# Patient Record
Sex: FEMALE | Race: WHITE | NOT HISPANIC OR LATINO | Employment: OTHER | URBAN - METROPOLITAN AREA
[De-identification: names, ages, dates, MRNs, and addresses within clinical notes are randomized per-mention and may not be internally consistent; named-entity substitution may affect disease eponyms.]

---

## 2023-03-14 ENCOUNTER — TELEPHONE (OUTPATIENT)
Dept: PRIMARY CARE | Facility: CLINIC | Age: 68
End: 2023-03-14

## 2023-03-14 ENCOUNTER — OFFICE VISIT (OUTPATIENT)
Dept: PRIMARY CARE | Facility: CLINIC | Age: 68
End: 2023-03-14
Payer: MEDICARE

## 2023-03-14 VITALS
DIASTOLIC BLOOD PRESSURE: 64 MMHG | HEART RATE: 83 BPM | WEIGHT: 149.4 LBS | BODY MASS INDEX: 25.08 KG/M2 | SYSTOLIC BLOOD PRESSURE: 148 MMHG | OXYGEN SATURATION: 98 % | TEMPERATURE: 96.7 F

## 2023-03-14 DIAGNOSIS — L30.9 DERMATITIS: Primary | ICD-10-CM

## 2023-03-14 PROBLEM — I10 HYPERTENSION: Status: ACTIVE | Noted: 2023-03-14

## 2023-03-14 PROBLEM — E78.5 BORDERLINE HYPERLIPIDEMIA: Status: ACTIVE | Noted: 2023-03-14

## 2023-03-14 PROBLEM — M85.80 OSTEOPENIA: Status: ACTIVE | Noted: 2023-03-14

## 2023-03-14 PROBLEM — N81.11 MIDLINE CYSTOCELE: Status: ACTIVE | Noted: 2023-03-14

## 2023-03-14 PROBLEM — L98.9 SKIN LESION: Status: ACTIVE | Noted: 2023-03-14

## 2023-03-14 PROBLEM — C50.919 BREAST CANCER (MULTI): Status: ACTIVE | Noted: 2023-03-14

## 2023-03-14 PROBLEM — N81.4 UTERINE PROLAPSE: Status: ACTIVE | Noted: 2023-03-14

## 2023-03-14 PROCEDURE — 1159F MED LIST DOCD IN RCRD: CPT | Performed by: INTERNAL MEDICINE

## 2023-03-14 PROCEDURE — 99213 OFFICE O/P EST LOW 20 MIN: CPT | Performed by: INTERNAL MEDICINE

## 2023-03-14 PROCEDURE — 3078F DIAST BP <80 MM HG: CPT | Performed by: INTERNAL MEDICINE

## 2023-03-14 PROCEDURE — 3077F SYST BP >= 140 MM HG: CPT | Performed by: INTERNAL MEDICINE

## 2023-03-14 RX ORDER — HYDROCORTISONE 25 MG/G
CREAM TOPICAL 2 TIMES DAILY PRN
Qty: 30 G | Refills: 0 | Status: SHIPPED | OUTPATIENT
Start: 2023-03-14 | End: 2023-11-14 | Stop reason: ALTCHOICE

## 2023-03-14 RX ORDER — FLUOROMETHOLONE 1 MG/ML
SUSPENSION/ DROPS OPHTHALMIC
COMMUNITY

## 2023-03-14 RX ORDER — BUTENAFINE HYDROCHLORIDE 10 MG/G
CREAM TOPICAL
Qty: 48 G | Refills: 1 | Status: SHIPPED | OUTPATIENT
Start: 2023-03-14 | End: 2023-03-15 | Stop reason: SDUPTHER

## 2023-03-14 RX ORDER — ACETAMINOPHEN 500 MG
TABLET ORAL
COMMUNITY

## 2023-03-14 ASSESSMENT — PATIENT HEALTH QUESTIONNAIRE - PHQ9
1. LITTLE INTEREST OR PLEASURE IN DOING THINGS: NOT AT ALL
2. FEELING DOWN, DEPRESSED OR HOPELESS: NOT AT ALL
SUM OF ALL RESPONSES TO PHQ9 QUESTIONS 1 AND 2: 0

## 2023-03-14 NOTE — PROGRESS NOTES
Subjective   Patient ID: Jessica Montiel is a 67 y.o. female who presents for Rash (Red itchy rash on neck).  Subjective   Jessica Montiel is a 67 y.o. female who presents for evaluation of a rash involving the upper body. Rash started 3 week ago. Lesions are red, and flat in texture. Rash has not changed over time. Rash is pruritic. Associated symptoms: none. Patient denies: fever. Patient has not had contacts with similar rash. Patient has not had new exposures (soaps, lotions, laundry detergents, foods, medications, plants, insects or animals).    Objective   /64 (BP Location: Left arm, Patient Position: Sitting, BP Cuff Size: Adult)   Pulse 83   Temp 35.9 °C (96.7 °F) (Temporal)   Wt 67.8 kg (149 lb 6.4 oz)   SpO2 98%   BMI 25.08 kg/m²   General:  alert   Skin:  erythema noted on neck           Review of Systems   Skin:  Positive for rash.     Objective   /64 (BP Location: Left arm, Patient Position: Sitting, BP Cuff Size: Adult)   Pulse 83   Temp 35.9 °C (96.7 °F) (Temporal)   Wt 67.8 kg (149 lb 6.4 oz)   SpO2 98%   BMI 25.08 kg/m²     Physical Exam    Assessment/Plan   Problem List Items Addressed This Visit    None  Visit Diagnoses       Dermatitis    -  Primary    Relevant Medications    butenafine 1 % cream    hydrocortisone 2.5 % cream            Unclear precise diagnosis.  Perhaps contact reaction.  Patient convinced improved with antifungals.  Will retreat with antifungal for 2 weeks, short course of hydrocortisone as needed.  Moisturize.  Reviewed skin care, limited soap and hot water.  Follow-up if not improving

## 2023-03-14 NOTE — TELEPHONE ENCOUNTER
Pt' pharmacy left a msg stating that the Butenafine 1% cream comes in tubed of 30gms not 48gms.  They also need to know how many times a day she will be applying

## 2023-03-15 DIAGNOSIS — L30.9 DERMATITIS: ICD-10-CM

## 2023-03-15 RX ORDER — BUTENAFINE HYDROCHLORIDE 10 MG/G
CREAM TOPICAL
Qty: 60 G | Refills: 1 | Status: SHIPPED | OUTPATIENT
Start: 2023-03-15 | End: 2023-07-22 | Stop reason: ALTCHOICE

## 2023-06-19 ENCOUNTER — TELEPHONE (OUTPATIENT)
Dept: PRIMARY CARE | Facility: CLINIC | Age: 68
End: 2023-06-19
Payer: MEDICARE

## 2023-06-19 NOTE — TELEPHONE ENCOUNTER
PT calling regarding order for PT - said her pain is through her shoulder now - not sure if she needs the PT order or if she needs to come in to be seen again or if she can get an x-ray or mri?? Please advise.

## 2023-07-21 ENCOUNTER — OFFICE VISIT (OUTPATIENT)
Dept: PRIMARY CARE | Facility: CLINIC | Age: 68
End: 2023-07-21
Payer: MEDICARE

## 2023-07-21 VITALS
BODY MASS INDEX: 25.38 KG/M2 | DIASTOLIC BLOOD PRESSURE: 70 MMHG | TEMPERATURE: 97.7 F | WEIGHT: 151.2 LBS | SYSTOLIC BLOOD PRESSURE: 130 MMHG

## 2023-07-21 DIAGNOSIS — G45.9 TIA (TRANSIENT ISCHEMIC ATTACK): Primary | ICD-10-CM

## 2023-07-21 DIAGNOSIS — M25.559 GREATER TROCHANTERIC PAIN SYNDROME: ICD-10-CM

## 2023-07-21 DIAGNOSIS — G43.809 OTHER MIGRAINE WITHOUT STATUS MIGRAINOSUS, NOT INTRACTABLE: ICD-10-CM

## 2023-07-21 DIAGNOSIS — C50.919 MALIGNANT NEOPLASM OF FEMALE BREAST, UNSPECIFIED ESTROGEN RECEPTOR STATUS, UNSPECIFIED LATERALITY, UNSPECIFIED SITE OF BREAST (MULTI): ICD-10-CM

## 2023-07-21 DIAGNOSIS — M54.32 SCIATICA OF LEFT SIDE: ICD-10-CM

## 2023-07-21 DIAGNOSIS — E78.5 BORDERLINE HYPERLIPIDEMIA: ICD-10-CM

## 2023-07-21 DIAGNOSIS — M54.12 CERVICAL RADICULITIS: ICD-10-CM

## 2023-07-21 PROCEDURE — 1036F TOBACCO NON-USER: CPT | Performed by: INTERNAL MEDICINE

## 2023-07-21 PROCEDURE — 1159F MED LIST DOCD IN RCRD: CPT | Performed by: INTERNAL MEDICINE

## 2023-07-21 PROCEDURE — 99495 TRANSJ CARE MGMT MOD F2F 14D: CPT | Performed by: INTERNAL MEDICINE

## 2023-07-21 PROCEDURE — 3078F DIAST BP <80 MM HG: CPT | Performed by: INTERNAL MEDICINE

## 2023-07-21 PROCEDURE — 1160F RVW MEDS BY RX/DR IN RCRD: CPT | Performed by: INTERNAL MEDICINE

## 2023-07-21 PROCEDURE — 3075F SYST BP GE 130 - 139MM HG: CPT | Performed by: INTERNAL MEDICINE

## 2023-07-21 RX ORDER — ASPIRIN 81 MG/1
81 TABLET ORAL DAILY
COMMUNITY

## 2023-07-21 RX ORDER — ATORVASTATIN CALCIUM 20 MG/1
20 TABLET, FILM COATED ORAL DAILY
COMMUNITY
Start: 2023-07-13 | End: 2023-07-21 | Stop reason: SDUPTHER

## 2023-07-21 RX ORDER — CLOPIDOGREL BISULFATE 75 MG/1
75 TABLET ORAL DAILY
Qty: 30 TABLET | Refills: 0 | Status: SHIPPED | OUTPATIENT
Start: 2023-07-21 | End: 2023-08-20

## 2023-07-21 RX ORDER — CLOPIDOGREL BISULFATE 75 MG/1
75 TABLET ORAL DAILY
COMMUNITY
Start: 2023-07-13 | End: 2023-07-21 | Stop reason: SDUPTHER

## 2023-07-21 RX ORDER — ATORVASTATIN CALCIUM 20 MG/1
20 TABLET, FILM COATED ORAL DAILY
Qty: 30 TABLET | Refills: 0 | Status: SHIPPED | OUTPATIENT
Start: 2023-07-21 | End: 2023-08-29 | Stop reason: DRUGHIGH

## 2023-07-21 ASSESSMENT — PATIENT HEALTH QUESTIONNAIRE - PHQ9
1. LITTLE INTEREST OR PLEASURE IN DOING THINGS: NOT AT ALL
SUM OF ALL RESPONSES TO PHQ9 QUESTIONS 1 AND 2: 0
2. FEELING DOWN, DEPRESSED OR HOPELESS: NOT AT ALL

## 2023-07-21 NOTE — PROGRESS NOTES
"Subjective   Patient ID: Jessica Montiel is a 67 y.o. female who presents for Hospital Follow-up and right sciatic pain.    HPI   8 days prior to visit patient with typical migraine headache she has been having over the past several years.  Typically relatively intense.  Made a phone call and noted that she could not \"get words out \".  Felt she could not get her mouth and tongue to move correctly.  No other signs or symptoms at that point.  Activated EMS.  Symptoms resolved gradually over about 30 minutes.  Went to [facility].  I do not have notes, but per patient extensive evaluation negative.  She states CT/MRI/EKG/telemetry/lab works all normal.  Placed on Plavix plus atorvastatin.  Aspirin.  Diagnosis TIA versus complicated migraine.  Has been completely without symptoms since discharge.  No residual signs or symptoms.  Is wearing a Holter monitor.  Has appointment upcoming with neurology.  Tolerating medicines without difficulty.  Has been having migraines over the past several years.  No history prior.  At home Bps 120s/70s    Additionally, complains of pain right hip..  Episodic about weekly.  Only last for few minutes.  Otherwise doing well.    Review of Systems  All systems reviewed and negative except as per history of present illness  Objective   /70   Temp 36.5 °C (97.7 °F)   Wt 68.6 kg (151 lb 3.2 oz)   BMI 25.38 kg/m²     Physical Exam  Constitutional:       Appearance: Normal appearance.   Cardiovascular:      Rate and Rhythm: Normal rate and regular rhythm.      Pulses: Normal pulses.      Heart sounds: Normal heart sounds. No murmur heard.  Pulmonary:      Effort: Pulmonary effort is normal. No respiratory distress.      Breath sounds: Normal breath sounds. No wheezing, rhonchi or rales.   Neurological:      Mental Status: She is alert.   Psychiatric:         Mood and Affect: Mood normal.         Behavior: Behavior normal.         Thought Content: Thought content normal.        "  Judgment: Judgment normal.         Assessment/Plan     #1 episode of aphasia- reviewed.  Resolved after ~30min.  Neg eval at hospital ([facility]).  I don't have records--> will obtain.  On Plavix/Lipiitor.  ? TIA vs complicated migrain.  Con't rx pending neuro eval  #2 trochanteric syndrome-consult physical therapy.  Follow-up if not improving

## 2023-08-29 ENCOUNTER — OFFICE VISIT (OUTPATIENT)
Dept: PRIMARY CARE | Facility: CLINIC | Age: 68
End: 2023-08-29
Payer: MEDICARE

## 2023-08-29 VITALS
WEIGHT: 154.4 LBS | BODY MASS INDEX: 25.92 KG/M2 | SYSTOLIC BLOOD PRESSURE: 116 MMHG | DIASTOLIC BLOOD PRESSURE: 70 MMHG | TEMPERATURE: 97.4 F

## 2023-08-29 DIAGNOSIS — G45.9 TIA (TRANSIENT ISCHEMIC ATTACK): Primary | ICD-10-CM

## 2023-08-29 DIAGNOSIS — Z00.00 ROUTINE GENERAL MEDICAL EXAMINATION AT A HEALTH CARE FACILITY: ICD-10-CM

## 2023-08-29 DIAGNOSIS — E78.5 BORDERLINE HYPERLIPIDEMIA: ICD-10-CM

## 2023-08-29 PROCEDURE — 1160F RVW MEDS BY RX/DR IN RCRD: CPT | Performed by: INTERNAL MEDICINE

## 2023-08-29 PROCEDURE — 3078F DIAST BP <80 MM HG: CPT | Performed by: INTERNAL MEDICINE

## 2023-08-29 PROCEDURE — 1159F MED LIST DOCD IN RCRD: CPT | Performed by: INTERNAL MEDICINE

## 2023-08-29 PROCEDURE — 3074F SYST BP LT 130 MM HG: CPT | Performed by: INTERNAL MEDICINE

## 2023-08-29 PROCEDURE — 1036F TOBACCO NON-USER: CPT | Performed by: INTERNAL MEDICINE

## 2023-08-29 PROCEDURE — 99214 OFFICE O/P EST MOD 30 MIN: CPT | Performed by: INTERNAL MEDICINE

## 2023-08-29 RX ORDER — ATORVASTATIN CALCIUM 10 MG/1
10 TABLET, FILM COATED ORAL DAILY
Qty: 90 TABLET | Refills: 2 | Status: SHIPPED | OUTPATIENT
Start: 2023-08-29 | End: 2024-08-28

## 2023-08-29 RX ORDER — ATORVASTATIN CALCIUM 20 MG/1
20 TABLET, FILM COATED ORAL DAILY
Qty: 30 TABLET | Refills: 5 | Status: CANCELLED | OUTPATIENT
Start: 2023-08-29 | End: 2024-02-25

## 2023-08-29 ASSESSMENT — PATIENT HEALTH QUESTIONNAIRE - PHQ9
2. FEELING DOWN, DEPRESSED OR HOPELESS: NOT AT ALL
SUM OF ALL RESPONSES TO PHQ9 QUESTIONS 1 AND 2: 0
1. LITTLE INTEREST OR PLEASURE IN DOING THINGS: NOT AT ALL

## 2023-08-29 NOTE — PROGRESS NOTES
Subjective   Patient ID: Jessica Montiel is a 67 y.o. female who presents for Follow-up (6 months).    HPI   S/p eval w/ neuro.  Per pt, suspect migraine.  EEG pending. Neuro stopped Plavix    Review of Systems  All systems reviewed and negative except as per history of present illness  Objective   /70   Temp 36.3 °C (97.4 °F)   Wt 70 kg (154 lb 6.4 oz)   BMI 25.92 kg/m²     Physical Exam  Constitutional:       Appearance: Normal appearance.   Cardiovascular:      Rate and Rhythm: Normal rate and regular rhythm.      Pulses: Normal pulses.      Heart sounds: Normal heart sounds. No murmur heard.  Pulmonary:      Effort: Pulmonary effort is normal. No respiratory distress.      Breath sounds: Normal breath sounds. No wheezing, rhonchi or rales.   Neurological:      Mental Status: She is alert.   Psychiatric:         Mood and Affect: Mood normal.         Behavior: Behavior normal.         Thought Content: Thought content normal.         Judgment: Judgment normal.       Assessment/Plan   #1 skin lesions (w/ h/o BCCA)- f/u dermatology.  #2 h/o breast CA-currently stable. Last mammogram 11/22.  #3 borderline lipids-reviewed. 10 yr risk ~10%. diet/exercise. Retest--> con't lipitor --> change to 10mg.   #4 htn- excellent BP at home and excellent LS changes (wt loss/exercise). Reviewed low-salt diet with daily exercise.  #5 insomnia-stable, continue melatonin  #6 nocturia- working w/ GYN, f/u.   #7 knee pain-reviewed. better. Normal exam.   #8 osteopenia- reviewed. FRAX <3/20%. vit D/Ca, exercise. retest 1.5 yrs.   #9 Lt LBP w/ scitica- no red flags. PT c/s, f/u INB  f/u GYN  mammo 11/22--> 11/23  s/p Shingrix    #1 episode of aphasia- reviewed.  Resolved after ~30min.  Neg eval at hospital ([facility]).  Neuro suspects migrain.  f/u  neuro  #2 trochanteric syndrome- con'tphysical therapy.  Follow-up if not improving

## 2023-08-31 DIAGNOSIS — L30.9 DERMATITIS: Primary | ICD-10-CM

## 2023-08-31 RX ORDER — TRIAMCINOLONE ACETONIDE 1 MG/G
CREAM TOPICAL 2 TIMES DAILY
Qty: 15 G | Refills: 5 | Status: SHIPPED | OUTPATIENT
Start: 2023-08-31 | End: 2023-11-14 | Stop reason: WASHOUT

## 2023-10-02 ENCOUNTER — TREATMENT (OUTPATIENT)
Dept: PHYSICAL THERAPY | Facility: CLINIC | Age: 68
End: 2023-10-02
Payer: MEDICARE

## 2023-10-02 DIAGNOSIS — G89.29 CHRONIC BILATERAL LOW BACK PAIN WITHOUT SCIATICA: ICD-10-CM

## 2023-10-02 DIAGNOSIS — M54.50 CHRONIC BILATERAL LOW BACK PAIN WITHOUT SCIATICA: ICD-10-CM

## 2023-10-02 DIAGNOSIS — M54.2 NECK PAIN: Primary | ICD-10-CM

## 2023-10-02 PROCEDURE — 97110 THERAPEUTIC EXERCISES: CPT | Mod: GP

## 2023-10-02 NOTE — PROGRESS NOTES
Physical Therapy    Physical Therapy Treatment    Patient Name: Jessica Montiel  MRN: [id number]  Today's Date: 10/2/2023         Assessment:   Pt continued to do well with exercise to improve strength and stability of core and hips. She demonstrates increased PROM without pain, but does have some popping in L hip during ER. She will benefit from continued PT to increase strength and stability of core and hips so she can return to walking for exercise and standing without low back pain.   May refer back to MD to assess for labral injury due to consistent popping during ER.    Plan:   - continue PT 2x/week to improve strength and stability of core and hips. Evaluate progression at next session.     Current Problem  1. Neck pain        2. Chronic bilateral low back pain without sciatica            Subjective   - Pt presents a dull pain in L hip and glut max area (2/10). She reports some soreness after last session, but notes it felt like a muscle soreness. 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     Objective   - note increased L hip PROM into flex, ABD, IR, ER. L hip pop during ER. TA = fair    Treatments:  - continued exercise to improve strength and stability of core and hips.   Therapeutic Exercise  Therapeutic Exercise Activity 1: TA w/ hip add (3x10)  Therapeutic Exercise Activity 2: hip abd (3x10)  Therapeutic Exercise Activity 3: clamshells (GN, supine, 3x12)  Therapeutic Exercise Activity 4: glut bridges (3x10)  Therapeutic Exercise Activity 5: crunches (3x10)  Therapeutic Exercise Activity 6: hip IR & ER (L (GN))  Therapeutic Exercise Activity 7: squats (3x12)  Therapeutic Exercise Activity 8: prone hip ext (L, 2x10)  Therapeutic Exercise Activity 9: step-downs (L, 2x10)    Goals:  Encounter Problems       Encounter Problems (Active)       PT Problem       will be independent with her HEP.        Start:  10/02/23    Expected End:  10/08/23            achieve full AROM of the lumbar and cervical spine for driving and transferring into her vehicle.       Start:  10/02/23    Expected End:  10/08/23            demonstrate TA contraction for increased lumbar stability.        Start:  10/02/23    Expected End:  10/08/23            demonstrate proper posture for improved spinal mechanics.        Start:  10/02/23    Expected End:  10/08/23            return to walking for exercise.        Start:  10/02/23    Expected End:  10/08/23            tolerate standing to prepare a meal without back or LE pain.        Start:  10/02/23    Expected End:  10/08/23

## 2023-10-05 ENCOUNTER — TREATMENT (OUTPATIENT)
Dept: PHYSICAL THERAPY | Facility: CLINIC | Age: 68
End: 2023-10-05
Payer: MEDICARE

## 2023-10-05 DIAGNOSIS — G89.29 CHRONIC LEFT-SIDED LOW BACK PAIN WITHOUT SCIATICA: ICD-10-CM

## 2023-10-05 DIAGNOSIS — M54.50 CHRONIC LEFT-SIDED LOW BACK PAIN WITHOUT SCIATICA: ICD-10-CM

## 2023-10-05 DIAGNOSIS — M54.2 NECK PAIN: Primary | ICD-10-CM

## 2023-10-05 PROCEDURE — 97110 THERAPEUTIC EXERCISES: CPT | Mod: GP | Performed by: PHYSICAL THERAPIST

## 2023-10-05 PROCEDURE — 97140 MANUAL THERAPY 1/> REGIONS: CPT | Mod: GP | Performed by: PHYSICAL THERAPIST

## 2023-10-05 PROCEDURE — 97110 THERAPEUTIC EXERCISES: CPT | Mod: GP

## 2023-10-05 NOTE — PROGRESS NOTES
Physical Therapy    Physical Therapy Treatment    Patient Name: Jessica Montiel  MRN: [id number]  Today's Date: 10/5/2023  Time Calculation  Start Time: 1005  Stop Time: 1057  Time Calculation (min): 52 min    Current Problem  1. Neck pain        2. Chronic left-sided low back pain without sciatica          ASSESSMENT:  - pt did well continuing exercise to improve strength of core and hips so she can stabilize lumbar spine during movement. D/t goals met, improvements in cervical and lumbar AROM, and decreases in pain at both areas, pt was instructed on and discharged to an HEP. She still demonstrates hip pain in the L groin and glut max area, but suspect d/t degenerative changes.      PLAN:  - discharge d/t progress, goals met, and improvements in neck and low back pain.      SUBJECTIVE:  - pt presents with current L sharp hip pain in the groin area (2/10) and also dull pain in the glut max area (1/10). She mentions being sore after last session for about a day. She also says clamshells exercise is causing pain when she is doing them at home.     HEP compliance: yes.    OBJECTIVE:  Cervical:    - negative TTP to (B) upper traps.   - cervical AROM: flex = 43, ext = 43, SB = 28 R, 22 L, rot = 45 R, 43 L  - L spurlings: negative  - PA C5&7: negative TTP  - joint mobility: L SB = increased mobility C2-5, cervical ext = increased extension (most notably C2-5), thoracic ext = increased mobility    - TA contraction = good    Lumbar:   - L SB = WFL  - L L2 myotome = 4+/5  - PA to L3 = negative  - negative L SLR, compression overload, L SB PIVMs    Treatments:  - continued exercise to strengthen core and hips to be able to stabilize lumbar spine during movement.   Therapeutic Exercise  Therapeutic Exercise Activity 1: TA w/ hip add, 3x10  Therapeutic Exercise Activity 2: glut bridges, 3x10  Therapeutic Exercise Activity 3: hip abd, (B), 3x10  Therapeutic Exercise Activity 4: prone hip ext, 2x12  Therapeutic Exercise  Activity 5: crunches, 3x10  Therapeutic Exercise Activity 6: squats, 3x10  Therapeutic Exercise Activity 7: step-downs, L, 2x10    Manual Therapy  Manual Therapy Activity 1: L hip PROM into flex, ABD  Manual Therapy Activity 2: L hip ABD mob  Manual Therapy Activity 3: L hip direct distraction    - pt was instructed to complete the following HEP:     Access Code: [email or web address]  URL: [email or web address]  Date: 10/05/2023  Prepared by: Charlotte Tabares    Exercises  - Supine Transversus Abdominis Bracing - Hands on Stomach  - 1 x daily - 3-4 x weekly - 3 sets - 10 reps  - Sidelying Hip Abduction  - 1 x daily - 3-4 x weekly - 3 sets - 10 reps  - ITB Stretch at Wall  - 1 x daily - 7 x weekly - 3 sets - 15 s hold  - Supine Bridge with Gluteal Set and Spinal Articulation  - 1 x daily - 3-4 x weekly - 3 sets - 10 reps  - Ab Prep  - 1 x daily - 3-4 x weekly - 3 sets - 10 reps  - Mini Squat with Counter Support  - 1 x daily - 3-4 x weekly - 3 sets - 10 reps  - Prone Hip Extension  - 1 x daily - 3-4 x weekly - 3 sets - 10 reps  - Forward Step Down  - 1 x daily - 3-4 x weekly - 3 sets - 10 reps

## 2023-10-09 ENCOUNTER — TELEPHONE (OUTPATIENT)
Dept: OBSTETRICS AND GYNECOLOGY | Facility: CLINIC | Age: 68
End: 2023-10-09
Payer: MEDICARE

## 2023-10-09 DIAGNOSIS — Z12.31 ENCOUNTER FOR SCREENING MAMMOGRAM FOR BREAST CANCER: ICD-10-CM

## 2023-10-09 NOTE — TELEPHONE ENCOUNTER
Patient  called stating that she would like a mammogram order so she can have it done before her appointment on 11/14/23.  She would like to schedule it herself.

## 2023-11-03 ENCOUNTER — LAB (OUTPATIENT)
Dept: LAB | Facility: LAB | Age: 68
End: 2023-11-03
Payer: MEDICARE

## 2023-11-03 DIAGNOSIS — G45.9 TIA (TRANSIENT ISCHEMIC ATTACK): ICD-10-CM

## 2023-11-03 DIAGNOSIS — E78.5 BORDERLINE HYPERLIPIDEMIA: ICD-10-CM

## 2023-11-03 DIAGNOSIS — Z00.00 ROUTINE GENERAL MEDICAL EXAMINATION AT A HEALTH CARE FACILITY: ICD-10-CM

## 2023-11-03 PROCEDURE — 84460 ALANINE AMINO (ALT) (SGPT): CPT

## 2023-11-03 PROCEDURE — 80048 BASIC METABOLIC PNL TOTAL CA: CPT

## 2023-11-03 PROCEDURE — 84443 ASSAY THYROID STIM HORMONE: CPT

## 2023-11-03 PROCEDURE — 83036 HEMOGLOBIN GLYCOSYLATED A1C: CPT

## 2023-11-03 PROCEDURE — 36415 COLL VENOUS BLD VENIPUNCTURE: CPT

## 2023-11-03 PROCEDURE — 80061 LIPID PANEL: CPT

## 2023-11-04 LAB
ALT SERPL W P-5'-P-CCNC: 18 U/L (ref 7–45)
ANION GAP SERPL CALC-SCNC: 13 MMOL/L (ref 10–20)
BUN SERPL-MCNC: 13 MG/DL (ref 6–23)
CALCIUM SERPL-MCNC: 9.3 MG/DL (ref 8.6–10.3)
CHLORIDE SERPL-SCNC: 102 MMOL/L (ref 98–107)
CHOLEST SERPL-MCNC: 136 MG/DL (ref 0–199)
CHOLESTEROL/HDL RATIO: 2.4
CO2 SERPL-SCNC: 26 MMOL/L (ref 21–32)
CREAT SERPL-MCNC: 0.73 MG/DL (ref 0.5–1.05)
EST. AVERAGE GLUCOSE BLD GHB EST-MCNC: 117 MG/DL
GFR SERPL CREATININE-BSD FRML MDRD: 90 ML/MIN/1.73M*2
GLUCOSE SERPL-MCNC: 83 MG/DL (ref 74–99)
HBA1C MFR BLD: 5.7 %
HDLC SERPL-MCNC: 56.8 MG/DL
LDLC SERPL CALC-MCNC: 65 MG/DL
NON HDL CHOLESTEROL: 79 MG/DL (ref 0–149)
POTASSIUM SERPL-SCNC: 4.4 MMOL/L (ref 3.5–5.3)
SODIUM SERPL-SCNC: 137 MMOL/L (ref 136–145)
TRIGL SERPL-MCNC: 73 MG/DL (ref 0–149)
TSH SERPL-ACNC: 1.65 MIU/L (ref 0.44–3.98)
VLDL: 15 MG/DL (ref 0–40)

## 2023-11-06 ENCOUNTER — ANCILLARY PROCEDURE (OUTPATIENT)
Dept: RADIOLOGY | Facility: CLINIC | Age: 68
End: 2023-11-06
Payer: MEDICARE

## 2023-11-06 DIAGNOSIS — Z12.31 ENCOUNTER FOR SCREENING MAMMOGRAM FOR MALIGNANT NEOPLASM OF BREAST: ICD-10-CM

## 2023-11-06 DIAGNOSIS — Z12.31 ENCOUNTER FOR SCREENING MAMMOGRAM FOR BREAST CANCER: ICD-10-CM

## 2023-11-06 PROCEDURE — 77067 SCR MAMMO BI INCL CAD: CPT

## 2023-11-06 PROCEDURE — 77063 BREAST TOMOSYNTHESIS BI: CPT | Performed by: RADIOLOGY

## 2023-11-06 PROCEDURE — 77067 SCR MAMMO BI INCL CAD: CPT | Performed by: RADIOLOGY

## 2023-11-13 PROBLEM — Z01.419 WELL WOMAN EXAM WITH ROUTINE GYNECOLOGICAL EXAM: Status: ACTIVE | Noted: 2023-11-13

## 2023-11-13 NOTE — PROGRESS NOTES
Subjective   Patient ID: Jessica Montiel is a 68 y.o. female who presents for Annual Exam.  Last visit with me was two years ago. At that time she noted cystocele and uterine prolapse. Only symptom was bulging of the vagina and she denied any incontinence or discomfort. She feels the prolapse is overall unchanged.        Review of Systems   Constitutional:  Negative for activity change.   HENT:  Negative for congestion.    Respiratory:  Negative for apnea and cough.    Cardiovascular:  Negative for chest pain.   Gastrointestinal:  Negative for constipation and diarrhea.   Genitourinary:  Negative for hematuria and vaginal pain.   Musculoskeletal:  Negative for joint swelling.   Neurological:  Negative for dizziness.   Psychiatric/Behavioral:  Negative for agitation.        Past Medical History:   Diagnosis Date    Personal history of other malignant neoplasm of skin     History of basal cell carcinoma (BCC)    Personal history of pulmonary embolism     History of pulmonary embolism      Past Surgical History:   Procedure Laterality Date    MASTECTOMY, PARTIAL  05/12/2014    Right Breast Partial Mastectomy    OTHER SURGICAL HISTORY  05/12/2014    Lymphatic Surgery Intraoperative Identification Of [place] Node    OTHER SURGICAL HISTORY  11/03/2021    Cornea transplantation    OTHER SURGICAL HISTORY  11/03/2021    Cataract surgery    OTHER SURGICAL HISTORY  11/03/2021    [place] tooth extraction    SENTINEL LYMPH NODE BIOPSY  05/12/2014    [place] Lymph Node Biopsy      Allergies   Allergen Reactions    Amoxicillin-Pot Clavulanate Diarrhea    Cephalosporins Unknown    Neosporin (Ooo-Eed-Uhbfu) [Neomycin-Bacitracnzn-Polymyxnb] Other     Red, irritated      Current Outpatient Medications on File Prior to Visit   Medication Sig Dispense Refill    aspirin 81 mg EC tablet Take 1 tablet (81 mg) by mouth once daily.      atorvastatin (Lipitor) 10 mg tablet Take 1 tablet (10 mg) by mouth once daily. 90 tablet 2     cholecalciferol (Vitamin D-3) 50 mcg (2,000 unit) capsule Vitamin D3 50 MCG (2000 UT) Oral Capsule   Refills: 0       Active      COQ10, LIPOSOMAL UBIQUINOL, ORAL Take by mouth.      fluorometholone (FML) 0.1 % ophthalmic suspension Fluorometholone 0.1 % Ophthalmic Suspension   Refills: 0       Active      fexofenadine (Allegra) 60 mg tablet Take 1 tablet (60 mg) by mouth once daily.      [DISCONTINUED] hydrocortisone 2.5 % cream Apply topically 2 times a day as needed for irritation or rash. For 7 days 30 g 0    [DISCONTINUED] neomycin-polymyxin-pramoxine (Neosporin) 3.5-10,000-10 mg-unit-mg/gram cream Apply topically 2 times a day.      [DISCONTINUED] triamcinolone (Kenalog) 0.1 % cream Apply topically 2 times a day. Apply to affected area 1-2 times daily as needed. 15 g 5     No current facility-administered medications on file prior to visit.        Objective   Physical Exam  Constitutional:       Appearance: Normal appearance.   Neck:      Thyroid: No thyromegaly.   Cardiovascular:      Rate and Rhythm: Normal rate and regular rhythm.      Heart sounds: Normal heart sounds.   Pulmonary:      Effort: Pulmonary effort is normal.      Breath sounds: Normal breath sounds.   Chest:      Chest wall: No mass.   Breasts:     Right: Normal. No inverted nipple, mass, nipple discharge or skin change.      Left: Normal. No inverted nipple, mass, nipple discharge or skin change.   Abdominal:      General: There is no distension.      Palpations: Abdomen is soft. There is no mass.      Tenderness: There is no abdominal tenderness.   Genitourinary:     General: Normal vulva.      Exam position: Lithotomy position.      Labia:         Right: No rash.         Left: No rash.       Vagina: Prolapsed vaginal walls present. No lesions.      Cervix: Normal.      Uterus: Normal. With uterine prolapse. Not enlarged and not tender.       Adnexa: Right adnexa normal and left adnexa normal.        Right: No mass or tenderness.           Left: No mass or tenderness.        Comments: Moderate cystocele is again noted.   Musculoskeletal:         General: No deformity.      Cervical back: Neck supple.   Lymphadenopathy:      Cervical: No cervical adenopathy.   Skin:     General: Skin is warm and dry.      Findings: No rash.   Neurological:      General: No focal deficit present.      Mental Status: She is alert.   Psychiatric:         Mood and Affect: Mood normal.         Behavior: Behavior is cooperative.         Thought Content: Thought content normal.           Problem List Items Addressed This Visit       Midline cystocele    Overview     Mild cystocele and uterine prolapse are present and are overall asymptomatic.          Uterine prolapse    Well woman exam with routine gynecological exam - Primary    Overview     She denies any history of cervical dysplasia. No further pap tests are indicated.   History is significant for right breast cancer diagnosed in 2009 and treated with lumpectomy, chemotherapy and radiation.  DEXA 7/2022 showed osteopenia of hip, normal bone density of spine.          Current Assessment & Plan     Mammogram is up to date.   Regular exercise and attaining/maintaining a healthy weight is encouraged.   Adequate calcium intake with diet or supplements is encouraged.    We will notify of any abnormal results.

## 2023-11-13 NOTE — ASSESSMENT & PLAN NOTE
Mammogram is up to date.   Regular exercise and attaining/maintaining a healthy weight is encouraged.   Adequate calcium intake with diet or supplements is encouraged.    We will notify of any abnormal results.

## 2023-11-14 ENCOUNTER — OFFICE VISIT (OUTPATIENT)
Dept: OBSTETRICS AND GYNECOLOGY | Facility: CLINIC | Age: 68
End: 2023-11-14
Payer: MEDICARE

## 2023-11-14 VITALS
BODY MASS INDEX: 26.16 KG/M2 | WEIGHT: 157 LBS | HEIGHT: 65 IN | DIASTOLIC BLOOD PRESSURE: 80 MMHG | SYSTOLIC BLOOD PRESSURE: 120 MMHG

## 2023-11-14 DIAGNOSIS — Z01.419 WELL WOMAN EXAM WITH ROUTINE GYNECOLOGICAL EXAM: Primary | ICD-10-CM

## 2023-11-14 DIAGNOSIS — N81.4 UTERINE PROLAPSE: ICD-10-CM

## 2023-11-14 DIAGNOSIS — N81.11 MIDLINE CYSTOCELE: ICD-10-CM

## 2023-11-14 PROCEDURE — G0101 CA SCREEN;PELVIC/BREAST EXAM: HCPCS | Performed by: OBSTETRICS & GYNECOLOGY

## 2023-11-14 PROCEDURE — 1160F RVW MEDS BY RX/DR IN RCRD: CPT | Performed by: OBSTETRICS & GYNECOLOGY

## 2023-11-14 PROCEDURE — 1159F MED LIST DOCD IN RCRD: CPT | Performed by: OBSTETRICS & GYNECOLOGY

## 2023-11-14 PROCEDURE — 3074F SYST BP LT 130 MM HG: CPT | Performed by: OBSTETRICS & GYNECOLOGY

## 2023-11-14 PROCEDURE — 1036F TOBACCO NON-USER: CPT | Performed by: OBSTETRICS & GYNECOLOGY

## 2023-11-14 PROCEDURE — 3079F DIAST BP 80-89 MM HG: CPT | Performed by: OBSTETRICS & GYNECOLOGY

## 2023-11-14 RX ORDER — FEXOFENADINE HCL 60 MG
60 TABLET ORAL DAILY
COMMUNITY

## 2023-11-14 ASSESSMENT — ENCOUNTER SYMPTOMS
JOINT SWELLING: 0
ACTIVITY CHANGE: 0
DIZZINESS: 0
DIARRHEA: 0
COUGH: 0
APNEA: 0
CONSTIPATION: 0
HEMATURIA: 0
AGITATION: 0

## 2024-02-29 ENCOUNTER — OFFICE VISIT (OUTPATIENT)
Dept: PRIMARY CARE | Facility: CLINIC | Age: 69
End: 2024-02-29
Payer: MEDICARE

## 2024-02-29 VITALS
DIASTOLIC BLOOD PRESSURE: 70 MMHG | TEMPERATURE: 96.1 F | SYSTOLIC BLOOD PRESSURE: 120 MMHG | BODY MASS INDEX: 26.39 KG/M2 | HEART RATE: 65 BPM | WEIGHT: 158.6 LBS | OXYGEN SATURATION: 97 %

## 2024-02-29 DIAGNOSIS — M25.552 LEFT HIP PAIN: ICD-10-CM

## 2024-02-29 DIAGNOSIS — Z00.00 REGULAR CHECK-UP: Primary | ICD-10-CM

## 2024-02-29 DIAGNOSIS — E78.5 BORDERLINE HYPERLIPIDEMIA: ICD-10-CM

## 2024-02-29 DIAGNOSIS — C50.919 MALIGNANT NEOPLASM OF FEMALE BREAST, UNSPECIFIED ESTROGEN RECEPTOR STATUS, UNSPECIFIED LATERALITY, UNSPECIFIED SITE OF BREAST (MULTI): ICD-10-CM

## 2024-02-29 DIAGNOSIS — R73.09 ELEVATED GLUCOSE: ICD-10-CM

## 2024-02-29 DIAGNOSIS — I10 PRIMARY HYPERTENSION: ICD-10-CM

## 2024-02-29 PROCEDURE — 1159F MED LIST DOCD IN RCRD: CPT | Performed by: INTERNAL MEDICINE

## 2024-02-29 PROCEDURE — 3078F DIAST BP <80 MM HG: CPT | Performed by: INTERNAL MEDICINE

## 2024-02-29 PROCEDURE — 1160F RVW MEDS BY RX/DR IN RCRD: CPT | Performed by: INTERNAL MEDICINE

## 2024-02-29 PROCEDURE — 1170F FXNL STATUS ASSESSED: CPT | Performed by: INTERNAL MEDICINE

## 2024-02-29 PROCEDURE — G0439 PPPS, SUBSEQ VISIT: HCPCS | Performed by: INTERNAL MEDICINE

## 2024-02-29 PROCEDURE — 1036F TOBACCO NON-USER: CPT | Performed by: INTERNAL MEDICINE

## 2024-02-29 PROCEDURE — 99213 OFFICE O/P EST LOW 20 MIN: CPT | Performed by: INTERNAL MEDICINE

## 2024-02-29 PROCEDURE — 1124F ACP DISCUSS-NO DSCNMKR DOCD: CPT | Performed by: INTERNAL MEDICINE

## 2024-02-29 PROCEDURE — 3074F SYST BP LT 130 MM HG: CPT | Performed by: INTERNAL MEDICINE

## 2024-02-29 ASSESSMENT — PATIENT HEALTH QUESTIONNAIRE - PHQ9
SUM OF ALL RESPONSES TO PHQ9 QUESTIONS 1 AND 2: 0
2. FEELING DOWN, DEPRESSED OR HOPELESS: NOT AT ALL
1. LITTLE INTEREST OR PLEASURE IN DOING THINGS: NOT AT ALL

## 2024-02-29 NOTE — PROGRESS NOTES
Subjective   Reason for Visit: Jessica Montiel is an 68 y.o. female here for a Medicare Wellness visit.               HPI  Overall well   #1 skin lesions (w/ h/o BCCA)   #2 h/o breast CA   #3 borderline lipids-trying to exercise and eat well.  #4 htn-no headache chest pain or dizziness.  No home blood pressure checks  #5 insomnia-stable   #6 nocturia-stable, mild at this point     Patient Care Team:  Lis Asencio MD as PCP - General  Lis Asencio MD as PCP - [facility] Attributed Provider     Review of Systems  All systems reviewed and negative except as per history of present illness  Objective   Vitals:  /70 (BP Location: Left arm, Patient Position: Sitting, BP Cuff Size: Adult)   Pulse 65   Temp 35.6 °C (96.1 °F) (Temporal)   Wt 71.9 kg (158 lb 9.6 oz)   SpO2 97%   BMI 26.39 kg/m²       Physical Exam  Constitutional:       Appearance: Normal appearance.   Cardiovascular:      Rate and Rhythm: Normal rate and regular rhythm.      Pulses: Normal pulses.      Heart sounds: Normal heart sounds. No murmur heard.  Pulmonary:      Effort: Pulmonary effort is normal. No respiratory distress.      Breath sounds: Normal breath sounds. No wheezing, rhonchi or rales.   Neurological:      Mental Status: She is alert.   Psychiatric:         Mood and Affect: Mood normal.         Behavior: Behavior normal.         Thought Content: Thought content normal.         Judgment: Judgment normal.           Lab Results   Component Value Date    WBC 5.1 07/29/2022    HGB 12.9 07/29/2022    HCT 39.3 07/29/2022     07/29/2022    CHOL 136 11/03/2023    TRIG 73 11/03/2023    HDL 56.8 11/03/2023    ALT 18 11/03/2023    AST 19 10/25/2018     11/03/2023    K 4.4 11/03/2023     11/03/2023    CREATININE 0.73 11/03/2023    BUN 13 11/03/2023    CO2 26 11/03/2023    TSH 1.65 11/03/2023    HGBA1C 5.7 (H) 11/03/2023       Assessment/Plan   Problem List Items Addressed This Visit    None  #1 skin lesions (w/ h/o BCCA)- f/u  dermatology.  #2 h/o breast CA-currently stable. Last mammogram 11/23.  #3 borderline lipids-reviewed. 10 yr risk ~10%. diet/exercise. Retest 6 months--> con't lipitor   #4 htn- excellent BP at home and excellent LS changes (wt loss/exercise). Reviewed low-salt diet with daily exercise.  #5 insomnia-stable, continue melatonin  #6 nocturia- working w/ GYN, f/u.   #7 knee pain-reviewed. better. Normal exam.   #8 osteopenia- reviewed. FRAX <3/20%. vit D/Ca, exercise. retest 1.5 yrs.   #9 Lt hip pain-reviewed.  Consult orthopedist, x-ray  #10 episode of aphasia- reviewed.  Resolved after ~30min.  Neg eval at hospital ([facility]).  Neuro suspects migrain.  f/u  neuro  #11 ifbs-spent time discussing significance.  Reviewed risk of progression to diabetes.  Diet and exercise reviewed at length.  Repeat hemoglobin A1c.  f/u GYN  mammo 11/23   s/p Carolann

## 2024-03-01 PROBLEM — M25.552 LEFT HIP PAIN: Status: ACTIVE | Noted: 2024-03-01

## 2024-03-01 ASSESSMENT — ACTIVITIES OF DAILY LIVING (ADL)
MANAGING_FINANCES: INDEPENDENT
TAKING_MEDICATION: INDEPENDENT
DRESSING: INDEPENDENT
BATHING: INDEPENDENT
GROCERY_SHOPPING: INDEPENDENT
DOING_HOUSEWORK: INDEPENDENT

## 2024-03-01 ASSESSMENT — PATIENT HEALTH QUESTIONNAIRE - PHQ9
SUM OF ALL RESPONSES TO PHQ9 QUESTIONS 1 AND 2: 0
1. LITTLE INTEREST OR PLEASURE IN DOING THINGS: NOT AT ALL
2. FEELING DOWN, DEPRESSED OR HOPELESS: NOT AT ALL

## 2024-03-11 ENCOUNTER — LAB (OUTPATIENT)
Dept: LAB | Facility: LAB | Age: 69
End: 2024-03-11
Payer: MEDICARE

## 2024-03-11 ENCOUNTER — HOSPITAL ENCOUNTER (OUTPATIENT)
Dept: RADIOLOGY | Facility: CLINIC | Age: 69
Discharge: HOME | End: 2024-03-11
Payer: MEDICARE

## 2024-03-11 DIAGNOSIS — R73.09 ELEVATED GLUCOSE: ICD-10-CM

## 2024-03-11 DIAGNOSIS — M25.552 LEFT HIP PAIN: ICD-10-CM

## 2024-03-11 DIAGNOSIS — E78.5 BORDERLINE HYPERLIPIDEMIA: ICD-10-CM

## 2024-03-11 LAB
ALT SERPL W P-5'-P-CCNC: 17 U/L (ref 7–45)
ANION GAP SERPL CALC-SCNC: 14 MMOL/L (ref 10–20)
BUN SERPL-MCNC: 15 MG/DL (ref 6–23)
CALCIUM SERPL-MCNC: 9.6 MG/DL (ref 8.6–10.3)
CHLORIDE SERPL-SCNC: 103 MMOL/L (ref 98–107)
CO2 SERPL-SCNC: 27 MMOL/L (ref 21–32)
CREAT SERPL-MCNC: 0.79 MG/DL (ref 0.5–1.05)
EGFRCR SERPLBLD CKD-EPI 2021: 82 ML/MIN/1.73M*2
GLUCOSE SERPL-MCNC: 89 MG/DL (ref 74–99)
POTASSIUM SERPL-SCNC: 4.2 MMOL/L (ref 3.5–5.3)
SODIUM SERPL-SCNC: 140 MMOL/L (ref 136–145)

## 2024-03-11 PROCEDURE — 83036 HEMOGLOBIN GLYCOSYLATED A1C: CPT

## 2024-03-11 PROCEDURE — 84460 ALANINE AMINO (ALT) (SGPT): CPT

## 2024-03-11 PROCEDURE — 80048 BASIC METABOLIC PNL TOTAL CA: CPT

## 2024-03-11 PROCEDURE — 73502 X-RAY EXAM HIP UNI 2-3 VIEWS: CPT | Mod: LEFT SIDE | Performed by: RADIOLOGY

## 2024-03-11 PROCEDURE — 73502 X-RAY EXAM HIP UNI 2-3 VIEWS: CPT | Mod: LT

## 2024-03-11 PROCEDURE — 36415 COLL VENOUS BLD VENIPUNCTURE: CPT

## 2024-03-12 LAB
EST. AVERAGE GLUCOSE BLD GHB EST-MCNC: 128 MG/DL
HBA1C MFR BLD: 6.1 %

## 2024-03-28 ENCOUNTER — HOSPITAL ENCOUNTER (OUTPATIENT)
Dept: RADIOLOGY | Facility: CLINIC | Age: 69
Discharge: HOME | End: 2024-03-28
Payer: MEDICARE

## 2024-03-28 ENCOUNTER — OFFICE VISIT (OUTPATIENT)
Dept: ORTHOPEDIC SURGERY | Facility: CLINIC | Age: 69
End: 2024-03-28
Payer: MEDICARE

## 2024-03-28 VITALS — HEIGHT: 65 IN | BODY MASS INDEX: 26.33 KG/M2 | WEIGHT: 158 LBS

## 2024-03-28 DIAGNOSIS — M25.552 LEFT HIP PAIN: ICD-10-CM

## 2024-03-28 DIAGNOSIS — M16.12 PRIMARY OSTEOARTHRITIS OF LEFT HIP: Primary | ICD-10-CM

## 2024-03-28 PROCEDURE — 1036F TOBACCO NON-USER: CPT | Performed by: ORTHOPAEDIC SURGERY

## 2024-03-28 PROCEDURE — 72170 X-RAY EXAM OF PELVIS: CPT | Performed by: RADIOLOGY

## 2024-03-28 PROCEDURE — 1160F RVW MEDS BY RX/DR IN RCRD: CPT | Performed by: ORTHOPAEDIC SURGERY

## 2024-03-28 PROCEDURE — 1125F AMNT PAIN NOTED PAIN PRSNT: CPT | Performed by: ORTHOPAEDIC SURGERY

## 2024-03-28 PROCEDURE — 1159F MED LIST DOCD IN RCRD: CPT | Performed by: ORTHOPAEDIC SURGERY

## 2024-03-28 PROCEDURE — 99204 OFFICE O/P NEW MOD 45 MIN: CPT | Performed by: ORTHOPAEDIC SURGERY

## 2024-03-28 PROCEDURE — 72170 X-RAY EXAM OF PELVIS: CPT

## 2024-03-28 RX ORDER — FLUTICASONE PROPIONATE 50 MCG
1 SPRAY, SUSPENSION (ML) NASAL DAILY
COMMUNITY

## 2024-03-28 ASSESSMENT — PAIN - FUNCTIONAL ASSESSMENT: PAIN_FUNCTIONAL_ASSESSMENT: 0-10

## 2024-03-28 ASSESSMENT — PAIN SCALES - GENERAL: PAINLEVEL_OUTOF10: 2

## 2024-03-28 NOTE — LETTER
March 28, 2024     Lis Asencio MD  [address]  [facility], [facility] [address]  [facility] [address]    Patient: Jessica Montiel   YOB: 1955   Date of Visit: 3/28/2024       Dear Dr. Lis Asencio MD:    Thank you for referring Jessica Montiel to me for evaluation. Below are my notes for this consultation.  If you have questions, please do not hesitate to call me. I look forward to following your patient along with you.       Sincerely,     Radha Duval, DO      CC: No Recipients  ______________________________________________________________________________________    PRIMARY CARE PHYSICIAN: Lis Asencio MD  REFERRING PROVIDER: Lis Asencio MD  [address]  [facility], [address] [address]     CONSULT ORTHOPAEDIC: Hip Evaluation        ASSESSMENT & PLAN    IMPRESSION:  1.  Primary osteoarthritis, left hip    PLAN:  Discussed with the patient findings above.  Reviewed x-rays with her.  She does have severe arthritic changes left hip demonstrated limited activities.  She has only been taking occasional anti-inflammatory medications and is not yet at the point where she wants to consider a corticosteroid injection or surgery.  She would like to continue activities given that they are not limited all the time and try some more judicious use of anti-inflammatories to see if this helps her symptoms.  Will consider hip replacement when her quality life continues to decline.  Discussed that she may continue to do her regular activities while using pain as a guide and may consider corticosteroid injection at any time if she would like.  Will follow-up as needed otherwise.      SUBJECTIVE  CHIEF COMPLAINT:   Chief Complaint   Patient presents with   • Left Hip - Pain        HPI: Jessica Montiel is a 68 y.o. patient. Jessica Montiel has had progressive problems with the left hip over the past 2 years. They do report any trauma when she fell backwards when her car door hit her from a  strong wind. They do not report any constant or progressive numbness or tingling in their legs. Their symptoms are interfering with activities which include pain on the lateral side of her hip and sometimes into her groin.  She states she also has pain in her left knee when she goes up stairs.  She did have a prior injury to her left knee in 2005 and had a DVT, but has not had trouble since then.      FUNCTIONAL STATUS: not limited.  AMBULATORY STATUS:  independant  PREVIOUS TREATMENTS: NSAIDS Ibuprofen as needed with mild improvement  Therapy physical therapy for 6 weeks last August with no improvement  HISTORY OF SURGERY ON AFFECTED HIP(S): No   BACK PAIN REPORTED: Yes       REVIEW OF SYSTEMS  Constitutional: See HPI for pain assessment, No significant weight loss, recent trauma  Cardiovascular: No chest pain, shortness of breath  Respiratory: No difficulty breathing, cough  Gastrointestinal: No nausea, vomiting, diarrhea, constipation  Musculoskeletal: Noted in HPI, positive for pain, restricted motion, stiffness  Integumentary: No rashes, easy bruising, redness   Neurological: no numbness or tingling in extremities, no gait disturbances   Psychiatric: No mood changes, memory changes, social issues  Heme/Lymph: no excessive swelling, easy bruising, excessive bleeding  ENT: No hearing changes  Eyes: No vision changes    Past Medical History:   Diagnosis Date   • Personal history of other malignant neoplasm of skin     History of basal cell carcinoma (BCC)   • Personal history of pulmonary embolism     History of pulmonary embolism        Allergies   Allergen Reactions   • Amoxicillin-Pot Clavulanate Diarrhea   • Cephalosporins Unknown   • Neosporin ([id number]) [Neomycin-Bacitracnzn-Polymyxnb] Other     Red, irritated        Past Surgical History:   Procedure Laterality Date   • MASTECTOMY, PARTIAL  05/12/2014    Right Breast Partial Mastectomy   • OTHER SURGICAL HISTORY  05/12/2014    Lymphatic Surgery  Intraoperative Identification Of [place] Node   • OTHER SURGICAL HISTORY  11/03/2021    Cornea transplantation   • OTHER SURGICAL HISTORY  11/03/2021    Cataract surgery   • OTHER SURGICAL HISTORY  11/03/2021    [place] tooth extraction   • SENTINEL LYMPH NODE BIOPSY  05/12/2014    [place] Lymph Node Biopsy        Family History   Problem Relation Name Age of Onset   • Hypertension Mother     • Hodgkin's lymphoma Mother          non-hodgkin's lymphoma   • Breast cancer Maternal Cousin  52        Social History     Socioeconomic History   • Marital status:      Spouse name: Not on file   • Number of children: Not on file   • Years of education: Not on file   • Highest education level: Not on file   Occupational History   • Not on file   Tobacco Use   • Smoking status: Never   • Smokeless tobacco: Never   Vaping Use   • Vaping Use: Never used   Substance and Sexual Activity   • Alcohol use: Never   • Drug use: Never   • Sexual activity: Not Currently     Partners: Male   Other Topics Concern   • Not on file   Social History Narrative   • Not on file     Social Determinants of Health     Financial Resource Strain: Not on file   Food Insecurity: Not on file   Transportation Needs: Not on file   Physical Activity: Not on file   Stress: Not on file   Social Connections: Not on file   Intimate Partner Violence: Not on file   Housing Stability: Not on file        CURRENT MEDICATIONS:   Current Outpatient Medications   Medication Sig Dispense Refill   • aspirin 81 mg EC tablet Take 1 tablet (81 mg) by mouth once daily.     • atorvastatin (Lipitor) 10 mg tablet Take 1 tablet (10 mg) by mouth once daily. 90 tablet 2   • cholecalciferol (Vitamin D-3) 50 mcg (2,000 unit) capsule Vitamin D3 50 MCG (2000 UT) Oral Capsule   Refills: 0       Active     • COQ10, LIPOSOMAL UBIQUINOL, ORAL Take by mouth.     • fluorometholone (FML) 0.1 % ophthalmic suspension Fluorometholone 0.1 % Ophthalmic Suspension   Refills: 0       Active  "    • fluticasone (Flonase) 50 mcg/actuation nasal spray Administer 1 spray into each nostril once daily. Shake gently. Before first use, prime pump. After use, clean tip and replace cap.     • fexofenadine (Allegra) 60 mg tablet Take 1 tablet (60 mg) by mouth once daily.       No current facility-administered medications for this visit.        OBJECTIVE    PHYSICAL EXAM      1/30/2023     1:33 PM 3/14/2023    11:21 AM 7/21/2023    11:03 AM 8/29/2023    10:49 AM 11/14/2023     9:58 AM 2/29/2024    12:03 PM 3/28/2024    12:49 PM   Vitals   Systolic 118 148 130 116 120 120    Diastolic 80 64 70 70 80 70    Heart Rate  83    65    Temp 36.4 °C (97.6 °F) 35.9 °C (96.7 °F) 36.5 °C (97.7 °F) 36.3 °C (97.4 °F)  35.6 °C (96.1 °F)    Height (in)     1.651 m (5' 5\")  1.651 m (5' 5\")   Weight (lb) 147 149.4 151.2 154.4 157 158.6 158   BMI 24.67 kg/m2 25.08 kg/m2 25.38 kg/m2 25.92 kg/m2 26.13 kg/m2 26.39 kg/m2 26.29 kg/m2   BSA (m2) 1.75 m2 1.76 m2 1.77 m2 1.79 m2 1.81 m2 1.82 m2 1.81 m2   Visit Report  Report Report Report Report Report Report      Body mass index is 26.29 kg/m².    GENERAL: A/Ox3, NAD. Appears healthy, well nourished  PSYCHIATRIC: Mood stable, appropriate memory recall  EYES: EOM intact, no scleral icterus  CARDIAC: regular rate  LUNGS: Breathing non-labored  SKIN: no erythema, rashes, or ecchymoses     MUSCULOSKELETAL:  Laterality: left Hip Exam  - ROM, Extension: full, no flexion contracture  - Strength: Abduction 5/5 against resitance, Flexion 5/5  - Palpation: mild TTP along greater trochanter  - Log roll/IR exam: painful and limited motion. Pain with hip flexion past 90 degrees and internal rotation  - Straight leg raise: negative  - EHL/PF/DF motor intact  - Gait: antalgic to arthritic side, negative Trendelenburg gait   - Special Tests: none performed    NEUROVASCULAR:  - Neurovascular Status: sensation intact to light touch distally  - Capillary refill brisk at extremities, Bilateral dorsalis pedis " pulse 2+           IMAGING:  Multiple views of the affected left hip(s) demonstrate: Severe arthritic changes noted severe medial pole disease, complete joint space narrowing noted centrally with subchondral sclerosis and subchondral cystic change and large osteophytic change.   X-rays were personally reviewed and interpreted by me.  Radiology reports were reviewed by me as well, if readily available at the time.        Radha Duval DO  Attending Surgeon  Joint Replacement and Adult Reconstructive Surgery  [place], [place]

## 2024-03-28 NOTE — PROGRESS NOTES
PRIMARY CARE PHYSICIAN: Lis Asencio MD  REFERRING PROVIDER: Lis Asencio MD  [address]  [facility], [address]  [place] [address]     CONSULT ORTHOPAEDIC: Hip Evaluation        ASSESSMENT & PLAN    IMPRESSION:  1.  Primary osteoarthritis, left hip    PLAN:  Discussed with the patient findings above.  Reviewed x-rays with her.  She does have severe arthritic changes left hip demonstrated limited activities.  She has only been taking occasional anti-inflammatory medications and is not yet at the point where she wants to consider a corticosteroid injection or surgery.  She would like to continue activities given that they are not limited all the time and try some more judicious use of anti-inflammatories to see if this helps her symptoms.  Will consider hip replacement when her quality life continues to decline.  Discussed that she may continue to do her regular activities while using pain as a guide and may consider corticosteroid injection at any time if she would like.  Will follow-up as needed otherwise.      SUBJECTIVE  CHIEF COMPLAINT:   Chief Complaint   Patient presents with    Left Hip - Pain        HPI: Jessica Montiel is a 68 y.o. patient. Jessica Montiel has had progressive problems with the left hip over the past 2 years. They do report any trauma when she fell backwards when her car door hit her from a strong wind. They do not report any constant or progressive numbness or tingling in their legs. Their symptoms are interfering with activities which include pain on the lateral side of her hip and sometimes into her groin.  She states she also has pain in her left knee when she goes up stairs.  She did have a prior injury to her left knee in 2005 and had a DVT, but has not had trouble since then.      FUNCTIONAL STATUS: not limited.  AMBULATORY STATUS:  independant  PREVIOUS TREATMENTS: NSAIDS Ibuprofen as needed with mild improvement  Therapy physical therapy for 6 weeks last August with  no improvement  HISTORY OF SURGERY ON AFFECTED HIP(S): No   BACK PAIN REPORTED: Yes       REVIEW OF SYSTEMS  Constitutional: See HPI for pain assessment, No significant weight loss, recent trauma  Cardiovascular: No chest pain, shortness of breath  Respiratory: No difficulty breathing, cough  Gastrointestinal: No nausea, vomiting, diarrhea, constipation  Musculoskeletal: Noted in HPI, positive for pain, restricted motion, stiffness  Integumentary: No rashes, easy bruising, redness   Neurological: no numbness or tingling in extremities, no gait disturbances   Psychiatric: No mood changes, memory changes, social issues  Heme/Lymph: no excessive swelling, easy bruising, excessive bleeding  ENT: No hearing changes  Eyes: No vision changes    Past Medical History:   Diagnosis Date    Personal history of other malignant neoplasm of skin     History of basal cell carcinoma (BCC)    Personal history of pulmonary embolism     History of pulmonary embolism        Allergies   Allergen Reactions    Amoxicillin-Pot Clavulanate Diarrhea    Cephalosporins Unknown    Neosporin (Xvj-Mle-Kgenm) [Neomycin-Bacitracnzn-Polymyxnb] Other     Red, irritated        Past Surgical History:   Procedure Laterality Date    MASTECTOMY, PARTIAL  05/12/2014    Right Breast Partial Mastectomy    OTHER SURGICAL HISTORY  05/12/2014    Lymphatic Surgery Intraoperative Identification Of [place] Node    OTHER SURGICAL HISTORY  11/03/2021    Cornea transplantation    OTHER SURGICAL HISTORY  11/03/2021    Cataract surgery    OTHER SURGICAL HISTORY  11/03/2021    [place] tooth extraction    SENTINEL LYMPH NODE BIOPSY  05/12/2014    [place] Lymph Node Biopsy        Family History   Problem Relation Name Age of Onset    Hypertension Mother      Hodgkin's lymphoma Mother          non-hodgkin's lymphoma    Breast cancer Maternal Cousin  52        Social History     Socioeconomic History    Marital status:      Spouse name: Not on file    Number of  children: Not on file    Years of education: Not on file    Highest education level: Not on file   Occupational History    Not on file   Tobacco Use    Smoking status: Never    Smokeless tobacco: Never   Vaping Use    Vaping Use: Never used   Substance and Sexual Activity    Alcohol use: Never    Drug use: Never    Sexual activity: Not Currently     Partners: Male   Other Topics Concern    Not on file   Social History Narrative    Not on file     Social Determinants of Health     Financial Resource Strain: Not on file   Food Insecurity: Not on file   Transportation Needs: Not on file   Physical Activity: Not on file   Stress: Not on file   Social Connections: Not on file   Intimate Partner Violence: Not on file   Housing Stability: Not on file        CURRENT MEDICATIONS:   Current Outpatient Medications   Medication Sig Dispense Refill    aspirin 81 mg EC tablet Take 1 tablet (81 mg) by mouth once daily.      atorvastatin (Lipitor) 10 mg tablet Take 1 tablet (10 mg) by mouth once daily. 90 tablet 2    cholecalciferol (Vitamin D-3) 50 mcg (2,000 unit) capsule Vitamin D3 50 MCG (2000 UT) Oral Capsule   Refills: 0       Active      COQ10, LIPOSOMAL UBIQUINOL, ORAL Take by mouth.      fluorometholone (FML) 0.1 % ophthalmic suspension Fluorometholone 0.1 % Ophthalmic Suspension   Refills: 0       Active      fluticasone (Flonase) 50 mcg/actuation nasal spray Administer 1 spray into each nostril once daily. Shake gently. Before first use, prime pump. After use, clean tip and replace cap.      fexofenadine (Allegra) 60 mg tablet Take 1 tablet (60 mg) by mouth once daily.       No current facility-administered medications for this visit.        OBJECTIVE    PHYSICAL EXAM      1/30/2023     1:33 PM 3/14/2023    11:21 AM 7/21/2023    11:03 AM 8/29/2023    10:49 AM 11/14/2023     9:58 AM 2/29/2024    12:03 PM 3/28/2024    12:49 PM   Vitals   Systolic 118 148 130 116 120 120    Diastolic 80 64 70 70 80 70    Heart Rate  83     "65    Temp 36.4 °C (97.6 °F) 35.9 °C (96.7 °F) 36.5 °C (97.7 °F) 36.3 °C (97.4 °F)  35.6 °C (96.1 °F)    Height (in)     1.651 m (5' 5\")  1.651 m (5' 5\")   Weight (lb) 147 149.4 151.2 154.4 157 158.6 158   BMI 24.67 kg/m2 25.08 kg/m2 25.38 kg/m2 25.92 kg/m2 26.13 kg/m2 26.39 kg/m2 26.29 kg/m2   BSA (m2) 1.75 m2 1.76 m2 1.77 m2 1.79 m2 1.81 m2 1.82 m2 1.81 m2   Visit Report  Report Report Report Report Report Report      Body mass index is 26.29 kg/m².    GENERAL: A/Ox3, NAD. Appears healthy, well nourished  PSYCHIATRIC: Mood stable, appropriate memory recall  EYES: EOM intact, no scleral icterus  CARDIAC: regular rate  LUNGS: Breathing non-labored  SKIN: no erythema, rashes, or ecchymoses     MUSCULOSKELETAL:  Laterality: left Hip Exam  - ROM, Extension: full, no flexion contracture  - Strength: Abduction 5/5 against resitance, Flexion 5/5  - Palpation: mild TTP along greater trochanter  - Log roll/IR exam: painful and limited motion. Pain with hip flexion past 90 degrees and internal rotation  - Straight leg raise: negative  - EHL/PF/DF motor intact  - Gait: antalgic to arthritic side, negative Trendelenburg gait   - Special Tests: none performed    NEUROVASCULAR:  - Neurovascular Status: sensation intact to light touch distally  - Capillary refill brisk at extremities, Bilateral dorsalis pedis pulse 2+           IMAGING:  Multiple views of the affected left hip(s) demonstrate: Severe arthritic changes noted severe medial pole disease, complete joint space narrowing noted centrally with subchondral sclerosis and subchondral cystic change and large osteophytic change.   X-rays were personally reviewed and interpreted by me.  Radiology reports were reviewed by me as well, if readily available at the time.        Radha Duval DO  Attending Surgeon  Joint Replacement and Adult Reconstructive Surgery  [facility], [place]                         "

## 2024-06-11 DIAGNOSIS — E78.5 BORDERLINE HYPERLIPIDEMIA: ICD-10-CM

## 2024-06-11 RX ORDER — ATORVASTATIN CALCIUM 10 MG/1
10 TABLET, FILM COATED ORAL DAILY
Qty: 90 TABLET | Refills: 2 | Status: SHIPPED | OUTPATIENT
Start: 2024-06-11 | End: 2025-06-11

## 2024-08-29 ENCOUNTER — APPOINTMENT (OUTPATIENT)
Dept: PRIMARY CARE | Facility: CLINIC | Age: 69
End: 2024-08-29
Payer: MEDICARE

## 2024-08-29 VITALS
TEMPERATURE: 97.7 F | SYSTOLIC BLOOD PRESSURE: 118 MMHG | DIASTOLIC BLOOD PRESSURE: 70 MMHG | WEIGHT: 154.6 LBS | BODY MASS INDEX: 25.73 KG/M2

## 2024-08-29 DIAGNOSIS — E11.9 TYPE 2 DIABETES MELLITUS WITHOUT COMPLICATION, WITHOUT LONG-TERM CURRENT USE OF INSULIN (MULTI): ICD-10-CM

## 2024-08-29 DIAGNOSIS — R73.09 ELEVATED GLUCOSE: ICD-10-CM

## 2024-08-29 DIAGNOSIS — I10 PRIMARY HYPERTENSION: ICD-10-CM

## 2024-08-29 DIAGNOSIS — M12.812 ROTATOR CUFF ARTHROPATHY, LEFT: Primary | ICD-10-CM

## 2024-08-29 DIAGNOSIS — E78.5 BORDERLINE HYPERLIPIDEMIA: ICD-10-CM

## 2024-08-29 LAB — POC HEMOGLOBIN A1C: 5.3 % (ref 4.2–6.5)

## 2024-08-29 PROCEDURE — 1124F ACP DISCUSS-NO DSCNMKR DOCD: CPT | Performed by: INTERNAL MEDICINE

## 2024-08-29 PROCEDURE — 83036 HEMOGLOBIN GLYCOSYLATED A1C: CPT | Performed by: INTERNAL MEDICINE

## 2024-08-29 PROCEDURE — 3078F DIAST BP <80 MM HG: CPT | Performed by: INTERNAL MEDICINE

## 2024-08-29 PROCEDURE — 3074F SYST BP LT 130 MM HG: CPT | Performed by: INTERNAL MEDICINE

## 2024-08-29 PROCEDURE — 1036F TOBACCO NON-USER: CPT | Performed by: INTERNAL MEDICINE

## 2024-08-29 PROCEDURE — 99214 OFFICE O/P EST MOD 30 MIN: CPT | Performed by: INTERNAL MEDICINE

## 2024-08-29 PROCEDURE — 1159F MED LIST DOCD IN RCRD: CPT | Performed by: INTERNAL MEDICINE

## 2024-08-29 PROCEDURE — 1160F RVW MEDS BY RX/DR IN RCRD: CPT | Performed by: INTERNAL MEDICINE

## 2024-08-29 PROCEDURE — 3044F HG A1C LEVEL LT 7.0%: CPT | Performed by: INTERNAL MEDICINE

## 2024-08-29 NOTE — PROGRESS NOTES
Subjective   Reason for Visit: Jessica Montiel is an 68 y.o. female here for a f/u      Past Medical, Surgical, and Family History reviewed and updated in chart.    Reviewed all medications by prescribing practitioner or clinical pharmacist (such as prescriptions, OTCs, herbal therapies and supplements) and documented in the medical record.    HPI  Overall well   #1 skin lesions (w/ h/o BCCA)   #2 h/o breast CA   #3 borderline lipids-trying to exercise and eat well.  #4 htn-no headache chest pain or dizziness.  No home blood pressure checks  #5 insomnia-stable   #6 nocturia-stable, mild at this point     Patient Care Team:  Lis Asencio MD as PCP - General  Lis Asencio MD [facility] PCP [facility] Attributed Provider     Review of Systems  All systems reviewed and negative except as per history of present illness  Objective   Vitals:  /70   Temp 36.5 °C (97.7 °F)   Wt 70.1 kg (154 lb 9.6 oz)   BMI 25.73 kg/m²       Physical Exam  Constitutional:       Appearance: Normal appearance.   Cardiovascular:      Rate and Rhythm: Normal rate and regular rhythm.      Pulses: Normal pulses.      Heart sounds: Normal heart sounds. No murmur heard.  Pulmonary:      Effort: Pulmonary effort is normal. No respiratory distress.      Breath sounds: Normal breath sounds. No wheezing, rhonchi or rales.   Neurological:      Mental Status: She is alert.   Psychiatric:         Mood and Affect: Mood normal.         Behavior: Behavior normal.         Thought Content: Thought content normal.         Judgment: Judgment normal.         Lab Results   Component Value Date    WBC 5.1 07/29/2022    HGB 12.9 07/29/2022    HCT 39.3 07/29/2022     07/29/2022    CHOL 136 11/03/2023    TRIG 73 11/03/2023    HDL 56.8 11/03/2023    ALT 17 03/11/2024    AST 19 10/25/2018     03/11/2024    K 4.2 03/11/2024     03/11/2024    CREATININE 0.79 03/11/2024    BUN 15 03/11/2024    CO2 27 03/11/2024    TSH 1.65 11/03/2023    HGBA1C 6.1 (H)  03/11/2024     Lab Results   Component Value Date    HGBA1C 5.3 08/29/2024         Assessment/Plan   Problem List Items Addressed This Visit    None  #1 skin lesions (w/ h/o BCCA)- f/u dermatology.  #2 h/o breast CA-currently stable. Last mammogram 11/23.  #3 borderline lipids-reviewed. 10 yr risk ~10%. diet/exercise. Retest 6 months--> con't lipitor   #4 htn- excellent BP at home and excellent LS changes (wt loss/exercise). Reviewed low-salt diet with daily exercise.  #5 insomnia-stable, continue melatonin  #6 nocturia- working w/ GYN, f/u.   #7 knee pain-reviewed. better. Normal exam.   #8 osteopenia- reviewed. FRAX <3/20%. vit D/Ca, exercise. retest 1.5 yrs.   #9 Lt hip pain-reviewed.  f/u   orthopedist, x-ray  #10 episode of aphasia- reviewed.  Resolved after ~30min.  Neg eval at hospital ([facility]).  Neuro suspects migrain.  f/u  neuro- repeat MRI pending  #11 ifbs-much improved.  Spent time discussing significance.  Reviewed risk of progression to diabetes.  Diet and exercise reviewed at length.  Repeat hemoglobin A1c 6 mths.  #12 [id number] neurology  #13 shoulder pain-reviewed.  Consult PT/Ortho.    colonoscopy- 2019 (dr montenegro), f/u 2024. ---> reviewed.  She will call to make an appointment  mammo 11/23   s/p Shingrix

## 2024-09-26 ENCOUNTER — HOSPITAL ENCOUNTER (OUTPATIENT)
Dept: RADIOLOGY | Facility: HOSPITAL | Age: 69
Discharge: HOME | End: 2024-09-26
Payer: MEDICARE

## 2024-09-26 ENCOUNTER — OFFICE VISIT (OUTPATIENT)
Dept: ORTHOPEDIC SURGERY | Facility: HOSPITAL | Age: 69
End: 2024-09-26
Payer: MEDICARE

## 2024-09-26 DIAGNOSIS — M12.812 ROTATOR CUFF ARTHROPATHY, LEFT: ICD-10-CM

## 2024-09-26 DIAGNOSIS — M25.512 LEFT SHOULDER PAIN, UNSPECIFIED CHRONICITY: ICD-10-CM

## 2024-09-26 PROCEDURE — 1160F RVW MEDS BY RX/DR IN RCRD: CPT | Performed by: STUDENT IN AN ORGANIZED HEALTH CARE EDUCATION/TRAINING PROGRAM

## 2024-09-26 PROCEDURE — 99214 OFFICE O/P EST MOD 30 MIN: CPT | Performed by: STUDENT IN AN ORGANIZED HEALTH CARE EDUCATION/TRAINING PROGRAM

## 2024-09-26 PROCEDURE — 1036F TOBACCO NON-USER: CPT | Performed by: STUDENT IN AN ORGANIZED HEALTH CARE EDUCATION/TRAINING PROGRAM

## 2024-09-26 PROCEDURE — 99204 OFFICE O/P NEW MOD 45 MIN: CPT | Performed by: STUDENT IN AN ORGANIZED HEALTH CARE EDUCATION/TRAINING PROGRAM

## 2024-09-26 PROCEDURE — 73030 X-RAY EXAM OF SHOULDER: CPT | Mod: LEFT SIDE | Performed by: RADIOLOGY

## 2024-09-26 PROCEDURE — 1123F ACP DISCUSS/DSCN MKR DOCD: CPT | Performed by: STUDENT IN AN ORGANIZED HEALTH CARE EDUCATION/TRAINING PROGRAM

## 2024-09-26 PROCEDURE — 2500000004 HC RX 250 GENERAL PHARMACY W/ HCPCS (ALT 636 FOR OP/ED): Performed by: STUDENT IN AN ORGANIZED HEALTH CARE EDUCATION/TRAINING PROGRAM

## 2024-09-26 PROCEDURE — 2500000005 HC RX 250 GENERAL PHARMACY W/O HCPCS: Performed by: STUDENT IN AN ORGANIZED HEALTH CARE EDUCATION/TRAINING PROGRAM

## 2024-09-26 PROCEDURE — 1159F MED LIST DOCD IN RCRD: CPT | Performed by: STUDENT IN AN ORGANIZED HEALTH CARE EDUCATION/TRAINING PROGRAM

## 2024-09-26 PROCEDURE — 20610 DRAIN/INJ JOINT/BURSA W/O US: CPT | Mod: LT | Performed by: STUDENT IN AN ORGANIZED HEALTH CARE EDUCATION/TRAINING PROGRAM

## 2024-09-26 PROCEDURE — 73030 X-RAY EXAM OF SHOULDER: CPT | Mod: LT

## 2024-09-26 NOTE — PROGRESS NOTES
PRIMARY CARE PHYSICIAN: Lis Asencio MD  REFERRING PROVIDER: Lis Asencio MD  [address]  [facility], [address]  [place] [address]     CONSULT ORTHOPAEDIC: Shoulder Evaluation    ASSESSMENT & PLAN    Impression: 68 y.o. female with left shoulder impingement and rotator cuff tendinosis.     Plan:   I explained to the patient the nature of their diagnosis.  I reviewed their imaging studies with them.    Based on the history, physical exam and imaging studies above, the patient's presentation is consistent with the above diagnosis.  I had a long discussion with the patient regarding their presentation and the treatment options.  We discussed initial nonoperative versus operative management options as well as potential further diagnostic imaging.  I reviewed her x-ray findings with her.  We discussed initial nonoperative management options.  I provided her with a corticosteroid injection as described below which she tolerated well.  She will avoid aggravating motions and positions.  She will work on her rotator cuff strength and scapular stabilization.    Follow-Up: Patient will follow-up as needed    At the end of the visit, all questions were answered in full. The patient is in agreement with the plan and recommendations. They will call the office with any questions/concerns.    Note dictated with Cloud[employer]ine software. Completed without full typed error editing and sent to avoid delay.       SUBJECTIVE  CHIEF COMPLAINT: Left shoulder pain    HPI: Jessica Montiel is a 68 y.o. patient who is right handed dominant and she presents today with left shoulder pain. X-rays done today. She reports an intermittent pain that has been going on for 6 weeks. Pain is described as sharp with over head, across body and behind the back motions. She has tried advil which did help. She has been doing home exercises that has helped her pain. She has history of left shoulder fracture treated at [facility].     They deny any associated neck pain.  No numbness, tingling.     REVIEW OF SYSTEMS  Constitutional: See HPI for pain assessment, No significant weight loss, recent trauma  Cardiovascular: No chest pain, shortness of breath  Respiratory: No difficulty breathing, cough  Gastrointestinal: No nausea, vomiting, diarrhea, constipation  Musculoskeletal: Noted in HPI, positive for pain, restricted motion, stiffness  Integumentary: No rashes, easy bruising, redness   Neurological: no numbness or tingling in extremities, no gait disturbances   Psychiatric: No mood changes, memory changes, social issues  Heme/Lymph: no excessive swelling, easy bruising, excessive bleeding  ENT: No hearing changes  Eyes: No vision changes    Past Medical History:   Diagnosis Date    Personal history of other malignant neoplasm of skin     History of basal cell carcinoma (BCC)    Personal history of pulmonary embolism     History of pulmonary embolism        Allergies   Allergen Reactions    Amoxicillin-Pot Clavulanate Diarrhea    Cephalosporins Unknown    Neosporin (Iqc-Gtm-Dtxvc) [Neomycin-Bacitracnzn-Polymyxnb] Other     Red, irritated        Past Surgical History:   Procedure Laterality Date    MASTECTOMY, PARTIAL  05/12/2014    Right Breast Partial Mastectomy    OTHER SURGICAL HISTORY  05/12/2014    Lymphatic Surgery Intraoperative Identification Of [place] Node    OTHER SURGICAL HISTORY  11/03/2021    Cornea transplantation    OTHER SURGICAL HISTORY  11/03/2021    Cataract surgery    OTHER SURGICAL HISTORY  11/03/2021    [place] tooth extraction    SENTINEL LYMPH NODE BIOPSY  05/12/2014    [place] Lymph Node Biopsy        Family History   Problem Relation Name Age of Onset    Hypertension Mother      Hodgkin's lymphoma Mother          non-hodgkin's lymphoma    Breast cancer Maternal Cousin  52        Social History     Socioeconomic History    Marital status:      Spouse name: Not on file    Number of children: Not on  file    Years of education: Not on file    Highest education level: Not on file   Occupational History    Not on file   Tobacco Use    Smoking status: Never    Smokeless tobacco: Never   Vaping Use    Vaping status: Never Used   Substance and Sexual Activity    Alcohol use: Never    Drug use: Never    Sexual activity: Not Currently     Partners: Male   Other Topics Concern    Not on file   Social History Narrative    Not on file     Social Determinants of Health     Financial Resource Strain: Not on file   Food Insecurity: Not on file   Transportation Needs: Not on file   Physical Activity: Not on file   Stress: Not on file   Social Connections: Not on file   Intimate Partner Violence: Not on file   Housing Stability: Not on file        CURRENT MEDICATIONS:   Current Outpatient Medications   Medication Sig Dispense Refill    aspirin 81 mg EC tablet Take 1 tablet (81 mg) by mouth once daily.      atorvastatin (Lipitor) 10 mg tablet Take 1 tablet (10 mg) by mouth once daily. 90 tablet 2    cholecalciferol (Vitamin D-3) 50 mcg (2,000 unit) capsule Vitamin D3 50 MCG (2000 UT) Oral Capsule   Refills: 0       Active      COQ10, LIPOSOMAL UBIQUINOL, ORAL Take by mouth.      fluorometholone (FML) 0.1 % ophthalmic suspension Fluorometholone 0.1 % Ophthalmic Suspension   Refills: 0       Active      fluticasone (Flonase) 50 mcg/actuation nasal spray Administer 1 spray into each nostril once daily. Shake gently. Before first use, prime pump. After use, clean tip and replace cap.      TURMERIC ORAL Take by mouth.       No current facility-administered medications for this visit.        OBJECTIVE    PHYSICAL EXAM      3/14/2023    11:21 AM 7/21/2023    11:03 AM 8/29/2023    10:49 AM 11/14/2023     9:58 AM 2/29/2024    12:03 PM 3/28/2024    12:49 PM 8/29/2024    11:29 AM   Vitals   Systolic 148 130 116 120 120  118   Diastolic 64 70 70 80 70  70   Heart Rate 83    65     Temp 35.9 °C (96.7 °F) 36.5 °C (97.7 °F) 36.3 °C (97.4 °F)   "35.6 °C (96.1 °F)  36.5 °C (97.7 °F)   Height (in)    1.651 m (5' 5\")  1.651 m (5' 5\")    Weight (lb) 149.4 151.2 154.4 157 158.6 158 154.6   BMI 25.08 kg/m2 25.38 kg/m2 25.92 kg/m2 26.13 kg/m2 26.39 kg/m2 26.29 kg/m2 25.73 kg/m2   BSA (m2) 1.76 m2 1.77 m2 1.79 m2 1.81 m2 1.82 m2 1.81 m2 1.79 m2   Visit Report Report Report Report Report Report Report Report      There is no height or weight on file to calculate BMI.    GENERAL: A/Ox3, NAD. Appears healthy, well nourished  PSYCHIATRIC: Mood stable, appropriate memory recall  EYES: EOM intact, no scleral icterus  CARDIOVASCULAR: Palpable peripheral pulses  LUNGS: Breathing non-labored on room air  SKIN: no erythema, rashes, or ecchymosis     MUSCULOSKELETAL:  Laterality: left Shoulder Exam  - Negative Spurlings, full painless neck ROM  - Skin intact  - No erythema or warmth  - No ecchymosis or soft tissue swelling  - Shoulder ROM: Forward flexion 150, ER 35, IR mid lumbar  - Strength:      Jobes 5-/5     ER 5-/5     Belly press and bearhug 5-/5  - Palpation: Positive tenderness posterolateral acromion and mild tenderness biceps groove  - Jules and Neers positive  - Speeds and O'Briens positive    NEUROVASCULAR:  - Neurovascular Status: sensation intact to light touch distally, upper extremity motor grossly intact  - Capillary refill brisk at extremities, Bilateral peripheral pulses 2+    Imaging: Multiple views of the affected left shoulder(s) demonstrate: Mild degenerative changes of the acromioclavicular joint, no acute osseous abnormality or fracture.   X-rays were personally reviewed and interpreted by me.  Radiology reports were reviewed by me as well, if readily available at the time.    L Inj/Asp: L subacromial bursa on 9/26/2024 10:45 AM  Indications: pain  Details: 25 G needle, posterior approach  Medications: 40 mg triamcinolone acetonide 40 mg/mL; 2 mL lidocaine 10 mg/mL (1 %)  Outcome: tolerated well, no immediate complications  Procedure, treatment " alternatives, risks and benefits explained, specific risks discussed. Consent was given by the patient. Immediately prior to procedure a time out was called to verify the correct patient, procedure, equipment, support staff and site/side marked as required. Patient was prepped and draped in the usual sterile fashion.               Martinez Monsalve MD  Attending Surgeon    Sports Medicine Orthopaedic Surgery  [facility] [employer]

## 2024-09-27 DIAGNOSIS — Z12.31 SCREENING MAMMOGRAM FOR BREAST CANCER: ICD-10-CM

## 2024-09-30 RX ORDER — LIDOCAINE HYDROCHLORIDE 10 MG/ML
2 INJECTION, SOLUTION INFILTRATION; PERINEURAL
Status: COMPLETED | OUTPATIENT
Start: 2024-09-26 | End: 2024-09-26

## 2024-09-30 RX ORDER — TRIAMCINOLONE ACETONIDE 40 MG/ML
40 INJECTION, SUSPENSION INTRA-ARTICULAR; INTRAMUSCULAR
Status: COMPLETED | OUTPATIENT
Start: 2024-09-26 | End: 2024-09-26

## 2024-11-08 ENCOUNTER — HOSPITAL ENCOUNTER (OUTPATIENT)
Dept: RADIOLOGY | Facility: CLINIC | Age: 69
Discharge: HOME | End: 2024-11-08
Payer: MEDICARE

## 2024-11-08 VITALS — WEIGHT: 154.54 LBS | HEIGHT: 65 IN | BODY MASS INDEX: 25.75 KG/M2

## 2024-11-08 DIAGNOSIS — Z12.31 ENCOUNTER FOR SCREENING MAMMOGRAM FOR MALIGNANT NEOPLASM OF BREAST: ICD-10-CM

## 2024-11-08 PROCEDURE — 77067 SCR MAMMO BI INCL CAD: CPT

## 2025-03-11 ENCOUNTER — APPOINTMENT (OUTPATIENT)
Dept: PRIMARY CARE | Facility: CLINIC | Age: 70
End: 2025-03-11
Payer: MEDICARE

## 2025-03-17 DIAGNOSIS — E78.5 BORDERLINE HYPERLIPIDEMIA: ICD-10-CM

## 2025-03-17 RX ORDER — ATORVASTATIN CALCIUM 10 MG/1
10 TABLET, FILM COATED ORAL DAILY
Qty: 90 TABLET | Refills: 2 | Status: SHIPPED | OUTPATIENT
Start: 2025-03-17 | End: 2026-03-17

## 2025-03-17 NOTE — TELEPHONE ENCOUNTER
Patient called, LAUREN, requesting refill of Atorvastatin 10 mg be sent to  in [place] on [address]      LOV - 8/29/24 NOV - 7/17/25

## 2025-07-17 ENCOUNTER — APPOINTMENT (OUTPATIENT)
Dept: PRIMARY CARE | Facility: CLINIC | Age: 70
End: 2025-07-17
Payer: MEDICARE

## 2025-07-17 VITALS
DIASTOLIC BLOOD PRESSURE: 76 MMHG | SYSTOLIC BLOOD PRESSURE: 138 MMHG | BODY MASS INDEX: 27.59 KG/M2 | WEIGHT: 161.6 LBS | HEIGHT: 64 IN

## 2025-07-17 DIAGNOSIS — Z13.0 SCREENING FOR BLOOD DISEASE: ICD-10-CM

## 2025-07-17 DIAGNOSIS — C50.919 MALIGNANT NEOPLASM OF FEMALE BREAST, UNSPECIFIED ESTROGEN RECEPTOR STATUS, UNSPECIFIED LATERALITY, UNSPECIFIED SITE OF BREAST: ICD-10-CM

## 2025-07-17 DIAGNOSIS — Z13.1 SCREENING FOR DIABETES MELLITUS: ICD-10-CM

## 2025-07-17 DIAGNOSIS — M85.80 OSTEOPENIA, UNSPECIFIED LOCATION: ICD-10-CM

## 2025-07-17 DIAGNOSIS — Z13.6 SCREENING FOR CARDIOVASCULAR CONDITION: ICD-10-CM

## 2025-07-17 DIAGNOSIS — G45.9 TIA (TRANSIENT ISCHEMIC ATTACK): ICD-10-CM

## 2025-07-17 DIAGNOSIS — Z00.00 WELL ADULT EXAM: Primary | ICD-10-CM

## 2025-07-17 DIAGNOSIS — R73.09 ELEVATED GLUCOSE: ICD-10-CM

## 2025-07-17 DIAGNOSIS — E78.5 BORDERLINE HYPERLIPIDEMIA: ICD-10-CM

## 2025-07-17 DIAGNOSIS — Z78.0 POST-MENOPAUSAL: ICD-10-CM

## 2025-07-17 DIAGNOSIS — I10 PRIMARY HYPERTENSION: ICD-10-CM

## 2025-07-17 PROCEDURE — 1123F ACP DISCUSS/DSCN MKR DOCD: CPT | Performed by: INTERNAL MEDICINE

## 2025-07-17 PROCEDURE — 1036F TOBACCO NON-USER: CPT | Performed by: INTERNAL MEDICINE

## 2025-07-17 PROCEDURE — 1170F FXNL STATUS ASSESSED: CPT | Performed by: INTERNAL MEDICINE

## 2025-07-17 PROCEDURE — 1160F RVW MEDS BY RX/DR IN RCRD: CPT | Performed by: INTERNAL MEDICINE

## 2025-07-17 PROCEDURE — 99214 OFFICE O/P EST MOD 30 MIN: CPT | Performed by: INTERNAL MEDICINE

## 2025-07-17 PROCEDURE — G0439 PPPS, SUBSEQ VISIT: HCPCS | Performed by: INTERNAL MEDICINE

## 2025-07-17 PROCEDURE — 3075F SYST BP GE 130 - 139MM HG: CPT | Performed by: INTERNAL MEDICINE

## 2025-07-17 PROCEDURE — 3078F DIAST BP <80 MM HG: CPT | Performed by: INTERNAL MEDICINE

## 2025-07-17 PROCEDURE — 1159F MED LIST DOCD IN RCRD: CPT | Performed by: INTERNAL MEDICINE

## 2025-07-17 PROCEDURE — 3008F BODY MASS INDEX DOCD: CPT | Performed by: INTERNAL MEDICINE

## 2025-07-17 RX ORDER — GLUCOSAMINE/CHONDR SU A SOD 750-600 MG
TABLET ORAL
COMMUNITY

## 2025-07-17 RX ORDER — MELATONIN 3 MG
CAPSULE ORAL NIGHTLY
COMMUNITY

## 2025-07-17 ASSESSMENT — ENCOUNTER SYMPTOMS
DEPRESSION: 0
LOSS OF SENSATION IN FEET: 0
OCCASIONAL FEELINGS OF UNSTEADINESS: 0

## 2025-07-17 ASSESSMENT — PATIENT HEALTH QUESTIONNAIRE - PHQ9
2. FEELING DOWN, DEPRESSED OR HOPELESS: NOT AT ALL
SUM OF ALL RESPONSES TO PHQ9 QUESTIONS 1 AND 2: 0
SUM OF ALL RESPONSES TO PHQ9 QUESTIONS 1 AND 2: 0
2. FEELING DOWN, DEPRESSED OR HOPELESS: NOT AT ALL
1. LITTLE INTEREST OR PLEASURE IN DOING THINGS: NOT AT ALL
1. LITTLE INTEREST OR PLEASURE IN DOING THINGS: NOT AT ALL

## 2025-07-17 ASSESSMENT — ACTIVITIES OF DAILY LIVING (ADL)
MANAGING_FINANCES: INDEPENDENT
GROCERY_SHOPPING: INDEPENDENT
DOING_HOUSEWORK: INDEPENDENT
TAKING_MEDICATION: INDEPENDENT
DRESSING: INDEPENDENT
BATHING: INDEPENDENT

## 2025-07-17 NOTE — PROGRESS NOTES
"Subjective   Reason for Visit: Jessica Montiel is an 69 y.o. female here for a Medicare Wellness visit.     Past Medical, Surgical, and Family History reviewed and updated in chart.    Reviewed all medications by prescribing practitioner or clinical pharmacist (such as prescriptions, OTCs, herbal therapies and supplements) and documented in the medical record.    HPI    Note 8/29/2024 reviewed.  +exercise x a few weeks.  No CP/SOB/LIZ  Diet- \"not as good\" as last year.  Wt trending up    #1 skin lesions (w/ h/o BCCA)   #2 h/o breast CA   #3 borderline lipids- less snaking.   On fiber  #4 htn-   #5 insomnia-   #6 nocturia-     Patient Care Team:  Lis Asencio MD as PCP - General  [facility] Asenc[facility] Attributed Provider     Review of Systems    Objective   Vitals:  /76 (BP Location: Left arm, Patient Position: Sitting)   Ht 1.632 m (5' 4.25\")   Wt 73.3 kg (161 lb 9.6 oz)   BMI 27.52 kg/m²       Physical Exam      Lab Results   Component Value Date    WBC 5.1 07/29/2022    HGB 12.9 07/29/2022    HCT 39.3 07/29/2022     07/29/2022    CHOL 136 11/03/2023    TRIG 73 11/03/2023    HDL 56.8 11/03/2023    ALT 17 03/11/2024    AST 19 10/25/2018     03/11/2024    K 4.2 03/11/2024     03/11/2024    CREATININE 0.79 03/11/2024    BUN 15 03/11/2024    CO2 27 03/11/2024    TSH 1.65 11/03/2023    HGBA1C 5.3 08/29/2024       Assessment & Plan  Well adult exam    Orders:  •  CBC; Future  •  Basic Metabolic Panel; Future  •  Alanine Aminotransferase; Future  •  Lipid Panel; Future  •  Hemoglobin A1C; Future    Screening for blood disease    Orders:  •  CBC; Future    Screening for cardiovascular condition    Orders:  •  Lipid Panel; Future    Screening for diabetes mellitus    Orders:  •  Hemoglobin A1C; Future    Elevated glucose    Orders:  •  Hemoglobin A1C; Future    Primary hypertension    Orders:  •  CBC; Future  •  Basic Metabolic Panel; Future    Borderline " hyperlipidemia    Orders:  •  Alanine Aminotransferase; Future  •  Lipid Panel; Future    TIA (transient ischemic attack)         Malignant neoplasm of female breast, unspecified estrogen receptor status, unspecified laterality, unspecified site of breast         Osteopenia, unspecified location         Post-menopausal    Orders:  •  XR DEXA bone density; Future              #1 skin lesions (w/ h/o BCCA)- f/u dermatology.  #2 h/o breast CA-  UTD  #3 borderline lipids- labs.  Con't rx  #4 htn- a little up today.  Follow at home, send me update 3-4 weeks.  Low salt diet/exercise reviewed  #5 insomnia- stable  #6 nocturia- ok  #7 knee pain- stable  #8 osteopenia- DEXA now  #9 Lt hip pain-reviewed.  f/u   orthopedist   #10 episode of aphasia- reviewed.  Resolved after ~30min.  Neg eval at hospital ([facility]).  Neuro suspects migrain.  f/u  neuro- repeat MRI pending  #11 ifbs-much improved.  Spent time discussing significance.  Reviewed risk of progression to diabetes.  Diet and exercise reviewed at length.  Repeat hemoglobin A1c 6 mths.  #12 migrain-good. follow-up neurology  #13 shoulder pain- better    colonoscopy- 2019 (dr montenegro), f/u due.  Reviewed importance.   mammo 11/24

## 2025-07-18 LAB
ALT SERPL-CCNC: 19 U/L (ref 6–29)
ANION GAP SERPL CALCULATED.4IONS-SCNC: 9 MMOL/L (CALC) (ref 7–17)
BUN SERPL-MCNC: 14 MG/DL (ref 7–25)
BUN/CREAT SERPL: NORMAL (CALC) (ref 6–22)
CALCIUM SERPL-MCNC: 9.6 MG/DL (ref 8.6–10.4)
CHLORIDE SERPL-SCNC: 102 MMOL/L (ref 98–110)
CHOLEST SERPL-MCNC: 136 MG/DL
CHOLEST/HDLC SERPL: 2.1 (CALC)
CO2 SERPL-SCNC: 26 MMOL/L (ref 20–32)
CREAT SERPL-MCNC: 0.77 MG/DL (ref 0.5–1.05)
EGFRCR SERPLBLD CKD-EPI 2021: 83 ML/MIN/1.73M2
ERYTHROCYTE [DISTWIDTH] IN BLOOD BY AUTOMATED COUNT: 13.2 % (ref 11–15)
EST. AVERAGE GLUCOSE BLD GHB EST-MCNC: 117 MG/DL
EST. AVERAGE GLUCOSE BLD GHB EST-SCNC: 6.5 MMOL/L
GLUCOSE SERPL-MCNC: 91 MG/DL (ref 65–99)
HBA1C MFR BLD: 5.7 %
HCT VFR BLD AUTO: 40.7 % (ref 35–45)
HDLC SERPL-MCNC: 66 MG/DL
HGB BLD-MCNC: 13.5 G/DL (ref 11.7–15.5)
LDLC SERPL CALC-MCNC: 56 MG/DL (CALC)
MCH RBC QN AUTO: 30.3 PG (ref 27–33)
MCHC RBC AUTO-ENTMCNC: 33.2 G/DL (ref 32–36)
MCV RBC AUTO: 91.3 FL (ref 80–100)
NONHDLC SERPL-MCNC: 70 MG/DL (CALC)
PLATELET # BLD AUTO: 203 THOUSAND/UL (ref 140–400)
PMV BLD REES-ECKER: 10.3 FL (ref 7.5–12.5)
POTASSIUM SERPL-SCNC: 4.4 MMOL/L (ref 3.5–5.3)
RBC # BLD AUTO: 4.46 MILLION/UL (ref 3.8–5.1)
SODIUM SERPL-SCNC: 137 MMOL/L (ref 135–146)
TRIGL SERPL-MCNC: 61 MG/DL
WBC # BLD AUTO: 3.9 THOUSAND/UL (ref 3.8–10.8)

## 2025-07-23 ENCOUNTER — HOSPITAL ENCOUNTER (OUTPATIENT)
Dept: RADIOLOGY | Facility: CLINIC | Age: 70
Discharge: HOME | End: 2025-07-23
Payer: MEDICARE

## 2025-07-23 DIAGNOSIS — Z78.0 POST-MENOPAUSAL: ICD-10-CM

## 2025-07-23 PROCEDURE — 77080 DXA BONE DENSITY AXIAL: CPT

## 2025-07-23 PROCEDURE — 77080 DXA BONE DENSITY AXIAL: CPT | Performed by: RADIOLOGY

## 2026-01-22 ENCOUNTER — APPOINTMENT (OUTPATIENT)
Dept: PRIMARY CARE | Facility: CLINIC | Age: 71
End: 2026-01-22
Payer: MEDICARE

## 2026-02-17 ENCOUNTER — APPOINTMENT (OUTPATIENT)
Dept: DERMATOLOGY | Facility: CLINIC | Age: 71
End: 2026-02-17
Payer: MEDICARE

## 2026-02-19 ENCOUNTER — APPOINTMENT (OUTPATIENT)
Dept: PRIMARY CARE | Facility: CLINIC | Age: 71
End: 2026-02-19
Payer: MEDICARE